# Patient Record
Sex: MALE | Race: WHITE | NOT HISPANIC OR LATINO | Employment: STUDENT | URBAN - METROPOLITAN AREA
[De-identification: names, ages, dates, MRNs, and addresses within clinical notes are randomized per-mention and may not be internally consistent; named-entity substitution may affect disease eponyms.]

---
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
## 2021-01-22 DIAGNOSIS — Z20.828 EXPOSURE TO SARS-ASSOCIATED CORONAVIRUS: Primary | ICD-10-CM

## 2021-01-25 DIAGNOSIS — Z20.828 EXPOSURE TO SARS-ASSOCIATED CORONAVIRUS: ICD-10-CM

## 2021-01-25 LAB — SARS-COV-2 RNA RESP QL NAA+PROBE: NEGATIVE

## 2021-01-25 PROCEDURE — U0003 INFECTIOUS AGENT DETECTION BY NUCLEIC ACID (DNA OR RNA); SEVERE ACUTE RESPIRATORY SYNDROME CORONAVIRUS 2 (SARS-COV-2) (CORONAVIRUS DISEASE [COVID-19]), AMPLIFIED PROBE TECHNIQUE, MAKING USE OF HIGH THROUGHPUT TECHNOLOGIES AS DESCRIBED BY CMS-2020-01-R: HCPCS | Performed by: PEDIATRICS

## 2021-01-25 PROCEDURE — U0005 INFEC AGEN DETEC AMPLI PROBE: HCPCS | Performed by: PEDIATRICS

## 2021-08-20 PROCEDURE — U0003 INFECTIOUS AGENT DETECTION BY NUCLEIC ACID (DNA OR RNA); SEVERE ACUTE RESPIRATORY SYNDROME CORONAVIRUS 2 (SARS-COV-2) (CORONAVIRUS DISEASE [COVID-19]), AMPLIFIED PROBE TECHNIQUE, MAKING USE OF HIGH THROUGHPUT TECHNOLOGIES AS DESCRIBED BY CMS-2020-01-R: HCPCS | Performed by: PEDIATRICS

## 2021-08-20 PROCEDURE — U0005 INFEC AGEN DETEC AMPLI PROBE: HCPCS | Performed by: PEDIATRICS

## 2022-02-01 NOTE — PROGRESS NOTES
1  postpartum, discharged patient, delivered via primary  section on Friday, 17, discharged on Monday, 17 arrives to unit via wheelchair for [address] labs from Dr. Vasquez Garcia office. Received phone call from Dr. Randy Braswell prior to arrival with orders for [address] labs and urine specimen. Patient oriented to Triage Bed 1 and call bell. Addended by: JOSE ROBLES on: 2/1/2022 12:21 PM     Modules accepted: Orders     Assessment  Diagnoses and all orders for this visit:    Acute pain of right shoulder  -     Ambulatory referral to Physical Therapy; Future        Discussion and Plan:    I reviewed the good news with the parents and the patient that he does not have an acute structural injury that requires surgical intervention and he can return to lacrosse activities with the help of the athletic trainers and physical therapy to rehabilitate his right shoulder  If he continues to be symptomatic with the right shoulder despite rehab then consideration to diagnostic arthroscopy would be provided but this is highly unlikely to be required at this time given the negative results of the MRI arthrogram     Subjective:   Patient ID: Hipolito Mcallister is a 25 y o  male      Patient returns for follow-up of his right shoulder MRI arthrogram   We were concerned for labral pathology and instability of the right shoulder  Symptoms are unchanged from his visit last week and has been not playing lacrosse as we wait the results of the study  Parents are present for the visit today          The following portions of the patient's history were reviewed and updated as appropriate: allergies, current medications, past family history, past medical history, past social history, past surgical history and problem list     Review of Systems   Constitutional: Negative for chills and fever  HENT: Negative for hearing loss  Eyes: Negative for visual disturbance  Respiratory: Negative for shortness of breath  Cardiovascular: Negative for chest pain  Gastrointestinal: Negative for abdominal pain  Musculoskeletal:        As reviewed in the HPI   Skin: Negative for rash  Neurological:        As reviewed in the HPI   Psychiatric/Behavioral: Negative for agitation  Objective:  Left Shoulder Exam     Tenderness   Left shoulder tenderness location: Diffusely about the shoulder      Range of Motion   The patient has normal left shoulder ROM     Muscle Strength   The patient has normal left shoulder strength  Tests   Cross arm: positive    Other   Erythema: absent  Sensation: normal  Pulse: present     Comments:  + Sawyer's Test    Painful arc of motion in all planes            Physical Exam   Constitutional: He is oriented to person, place, and time  He appears well-developed and well-nourished  No distress  Eyes: Pupils are equal, round, and reactive to light  Conjunctivae are normal    Neck: Normal range of motion  Neck supple  Cardiovascular: Normal rate, regular rhythm and intact distal pulses  Pulmonary/Chest: Effort normal and breath sounds normal    Abdominal: Soft  Bowel sounds are normal    Neurological: He is alert and oriented to person, place, and time  He has normal reflexes  Skin: Skin is warm and dry  No rash noted  No erythema  Psychiatric: He has a normal mood and affect  His behavior is normal            I have personally reviewed pertinent films in PACS and my interpretation is as follows      MRI arthrogram right shoulder shows no evidence of labral pathology or rotator cuff tear, the do comment on some rotator cuff tendinitis which in my opinion is normal on that study

## 2022-03-05 ENCOUNTER — OFFICE VISIT (OUTPATIENT)
Dept: URGENT CARE | Facility: CLINIC | Age: 22
End: 2022-03-05
Payer: COMMERCIAL

## 2022-03-05 VITALS
RESPIRATION RATE: 16 BRPM | HEART RATE: 60 BPM | WEIGHT: 150 LBS | HEIGHT: 67 IN | BODY MASS INDEX: 23.54 KG/M2 | TEMPERATURE: 98.4 F

## 2022-03-05 DIAGNOSIS — H10.9 BACTERIAL CONJUNCTIVITIS OF BOTH EYES: Primary | ICD-10-CM

## 2022-03-05 DIAGNOSIS — B96.89 BACTERIAL CONJUNCTIVITIS OF BOTH EYES: Primary | ICD-10-CM

## 2022-03-05 DIAGNOSIS — B96.89 BACTERIAL PHARYNGITIS: ICD-10-CM

## 2022-03-05 DIAGNOSIS — J02.8 BACTERIAL PHARYNGITIS: ICD-10-CM

## 2022-03-05 PROCEDURE — 99213 OFFICE O/P EST LOW 20 MIN: CPT | Performed by: PHYSICIAN ASSISTANT

## 2022-03-05 RX ORDER — POLYMYXIN B SULFATE AND TRIMETHOPRIM 1; 10000 MG/ML; [USP'U]/ML
1 SOLUTION OPHTHALMIC EVERY 4 HOURS
Qty: 10 ML | Refills: 0 | Status: SHIPPED | OUTPATIENT
Start: 2022-03-05 | End: 2022-03-12

## 2022-03-05 RX ORDER — LIDOCAINE HYDROCHLORIDE 20 MG/ML
10 SOLUTION OROPHARYNGEAL 4 TIMES DAILY PRN
Qty: 100 ML | Refills: 0 | Status: SHIPPED | OUTPATIENT
Start: 2022-03-05

## 2022-03-05 RX ORDER — AZITHROMYCIN 250 MG/1
TABLET, FILM COATED ORAL
Qty: 6 TABLET | Refills: 0 | Status: SHIPPED | OUTPATIENT
Start: 2022-03-05 | End: 2022-03-09

## 2022-03-05 NOTE — PATIENT INSTRUCTIONS
Begin antibiotic as directed  Take with probiotic or yogurt to prevent stomach upset  Begin use of antibiotic eyedrops  Wash all sheets, bedding, anything that has come in contact with URIs  Avoid itching or rubbing at the eyes  Continue with supportive care: warm salt water gargles, lozenges, warm humidified air, saline nasal spray, plenty of fluids, plenty of rest, and over the counter pain medication as needed  Follow up with PCP in 3-5 days if symptoms do not resolve  Proceed to the ER with worsening throat pain, fever, chills,difficulties breathing, difficulties tolerating oral intake  Conjunctivitis   WHAT YOU SHOULD KNOW:   Conjunctivitis, or pink eye, is inflammation of your conjunctiva  The conjunctiva is a thin tissue that covers the front of your eye and the back of your eyelids  The conjunctiva helps protect your eye and keep it moist         INSTRUCTIONS:   Medicines:   · Allergy medicine: This medicine helps decrease itchy, red, swollen eyes caused by allergies  It may be given as a pill, eye drops, or nasal spray  · Antibiotics:  You will need antibiotics if your conjunctivitis is caused by bacteria  This medicine may be given as eye drops or eye ointment  · Steroid medicine: This medicine helps decrease inflammation  It may be given as a pill, eye drops, or nasal spray  · Take your medicine as directed  Call your healthcare provider if you think your medicine is not helping or if you have side effects  Tell him if you are allergic to any medicine  Keep a list of the medicines, vitamins, and herbs you take  Include the amounts, and when and why you take them  Bring the list or the pill bottles to follow-up visits  Carry your medicine list with you in case of an emergency  Follow up with your primary healthcare provider as directed: You may need to return for more tests on your eyes  These will help your primary healthcare provider check for eye damage   Write down your questions so you remember to ask them during your visits  Avoid the spread of conjunctivitis:   · Wash your hands often:  Wash your hands before you touch your eyes  Also wash your hands before you prepare or eat food and after you use the bathroom or change a diaper  · Avoid allergens:  Try to avoid the things that cause your allergies, such as pets, dust, or grass  · Avoid contact:  Do not share towels or washcloths  Try to stay away from others as much as possible  Ask when you can return to work or school  · Throw away eye makeup:  Throw away mascara and other eye makeup  Manage your symptoms:  · Apply a cool compress:  Wet a washcloth with cold water and place it on your eye  This will help decrease swelling  · Use eye drops:  Eye drops, or artificial tears, can be bought without a doctor's order  They help keep your eye moist     · Do not wear contact lenses: They can irritate your eye  Throw away the pair you are using and ask when you can wear them again  Use a new pair of lenses when your primary healthcare provider says it is okay  · Flush your eye:  You may need to flush your eye with saline to help decrease your symptoms  Ask for more information on how to flush your eye  Contact your primary healthcare provider if:   · Your eyesight becomes blurry  · You have tiny bumps or spots of blood on your eye  · You have questions or concerns about your condition or care  Return to the emergency department if:   · The swelling in your eye gets worse, even after treatment  · Your vision suddenly becomes worse or you cannot see at all  · Your eye begins to bleed  © 2014 [address] is for End User's use only and may not be sold, redistributed or otherwise used for commercial purposes  All illustrations and images included in CareNotes® are the copyrighted property of [employer]  or Fransico Peralta    The above information is an educational aid only  It is not intended as medical advice for individual conditions or treatments  Talk to your doctor, nurse or pharmacist before following any medical regimen to see if it is safe and effective for you  Pharyngitis   WHAT YOU NEED TO KNOW:   Pharyngitis, or sore throat, is inflammation of the tissues and structures in your pharynx (throat)  Pharyngitis is most often caused by bacteria  It may also be caused by a cold or flu virus  Other causes include smoking, allergies, or acid reflux  DISCHARGE INSTRUCTIONS:   Call 911 for any of the following:   · You have trouble breathing or swallowing because your throat is swollen or sore  Return to the emergency department if:   · You are drooling because it hurts too much to swallow  · Your fever is higher than 102? F (39?C) or lasts longer than 3 days  · You are confused  · You taste blood in your throat  Contact your healthcare provider if:   · Your throat pain gets worse  · You have a painful lump in your throat that does not go away after 5 days  · Your symptoms do not improve after 5 days  · You have questions or concerns about your condition or care  Medicines:  Viral pharyngitis will go away on its own without treatment  Your sore throat should start to feel better in 3 to 5 days for both viral and bacterial infections  You may need any of the following:  · Antibiotics  treat a bacterial infection  · NSAIDs , such as ibuprofen, help decrease swelling, pain, and fever  NSAIDs can cause stomach bleeding or kidney problems in certain people  If you take blood thinner medicine, always ask your healthcare provider if NSAIDs are safe for you  Always read the medicine label and follow directions  · Acetaminophen  decreases pain and fever  It is available without a doctor's order  Ask how much to take and how often to take it  Follow directions  Acetaminophen can cause liver damage if not taken correctly      · Take your medicine as directed  Contact your healthcare provider if you think your medicine is not helping or if you have side effects  Tell him or her if you are allergic to any medicine  Keep a list of the medicines, vitamins, and herbs you take  Include the amounts, and when and why you take them  Bring the list or the pill bottles to follow-up visits  Carry your medicine list with you in case of an emergency  Manage your symptoms:   · Gargle salt water  Mix ¼ teaspoon salt in an 8 ounce glass of warm water and gargle  This may help decrease swelling in your throat  · Drink liquids as directed  You may need to drink more liquids than usual  Liquids may help soothe your throat and prevent dehydration  Ask how much liquid to drink each day and which liquids are best for you  · Use a cool-steam humidifier  to help moisten the air in your room and calm your cough  · Soothe your throat  with cough drops, ice, soft foods, or popsicles  Prevent the spread of pharyngitis:  Cover your mouth and nose when you cough or sneeze  Do not share food or drinks  Wash your hands often  Use soap and water  If soap and water are unavailable, use an alcohol based hand   Follow up with your doctor as directed:  Write down your questions so you remember to ask them during your visits  © Copyright [employer] 2022 Information is for End User's use only and may not be sold, redistributed or otherwise used for commercial purposes  All illustrations and images included in CareNotes® are the copyrighted property of [employer]  or Carli Naik   The above information is an  only  It is not intended as medical advice for individual conditions or treatments  Talk to your doctor, nurse or pharmacist before following any medical regimen to see if it is safe and effective for you

## 2022-03-05 NOTE — PROGRESS NOTES
[address] [facility]        NAME: Elisabet Pete is a 24 y o  male  : 2000    MRN: [id number]  DATE: 2022  TIME: 12:10 PM    Assessment and Plan   Bacterial conjunctivitis of both eyes [H10 9, B96 89]  1  Bacterial conjunctivitis of both eyes  polymyxin b-trimethoprim (POLYTRIM) ophthalmic solution   2  Bacterial pharyngitis  Lidocaine Viscous HCl (XYLOCAINE) 2 % mucosal solution    azithromycin (ZITHROMAX) 250 mg tablet         Patient Instructions     Begin antibiotic as directed  Take with probiotic or yogurt to prevent stomach upset  Begin use of antibiotic eyedrops  Wash all sheets, bedding, anything that has come in contact with URIs  Avoid itching or rubbing at the eyes  Continue with supportive care: warm salt water gargles, lozenges, warm humidified air, saline nasal spray, plenty of fluids, plenty of rest, and over the counter pain medication as needed  Follow up with PCP in 3-5 days if symptoms do not resolve  Proceed to the ER with worsening throat pain, fever, chills,difficulties breathing, difficulties tolerating oral intake  Follow up with PCP in 3-5 days  Proceed to  ER if symptoms worsen  Chief Complaint     Chief Complaint   Patient presents with    Conjunctivitis     both eyes red for 3 days, discharge, sore throat on and off 3 days         History of Present Illness       24year old male presents to the office for c/o of three day history of ralph eye redness, discharge as well as soreness of the throat on and off over the last three days  Patient notes that symptoms eye redness and discharge began in the left eye in the travel to the right  His roommate was sick with pinkeye as well  Patient notes that his sore throat with increased with swallowing  Does admit to history of strep throat before in the past   Denies history of mono  Has been using over-the-counter Tylenol to pain      Conjunctivitis   The current episode started 3 to 5 days ago   The problem has been rapidly worsening  Associated symptoms include eye itching, diarrhea (this morning ), congestion, rhinorrhea, sore throat, eye discharge and eye redness  Pertinent negatives include no fever, no photophobia, no headaches, no cough, no rash and no eye pain  Review of Systems   Review of Systems   Constitutional: Negative for chills and fever  HENT: Positive for congestion, rhinorrhea, sore throat and trouble swallowing  Negative for sinus pressure and sinus pain  Eyes: Positive for discharge, redness and itching  Negative for photophobia and pain  Respiratory: Negative for cough  Cardiovascular: Negative  Gastrointestinal: Positive for diarrhea (this morning )  Genitourinary: Negative  Musculoskeletal: Negative for myalgias  Skin: Negative for rash  Neurological: Negative for dizziness and headaches  Current Medications       Current Outpatient Medications:     azithromycin (ZITHROMAX) 250 mg tablet, Take 2 tablets today then 1 tablet daily x 4 days, Disp: 6 tablet, Rfl: 0    Lidocaine Viscous HCl (XYLOCAINE) 2 % mucosal solution, Swish and spit 10 mL 4 (four) times a day as needed for mouth pain or discomfort or mucositis, Disp: 100 mL, Rfl: 0    polymyxin b-trimethoprim (POLYTRIM) ophthalmic solution, Administer 1 drop to both eyes every 4 (four) hours for 7 days, Disp: 10 mL, Rfl: 0    Current Allergies     Allergies as of 03/05/2022 - Reviewed 03/05/2022   Allergen Reaction Noted    Shellfish-derived products - food allergy Anaphylaxis 05/04/2021    Fish oil - food allergy  03/14/2016    Ibuprofen  04/16/2018            The following portions of the patient's history were reviewed and updated as appropriate: allergies, current medications, past family history, past medical history, past social history, past surgical history and problem list      No past medical history on file      Past Surgical History:   Procedure Laterality Date    FL INJECTION RIGHT SHOULDER (ARTHROGRAM)  3/25/2019       Family History   Problem Relation Age of Onset    Osteoporosis Maternal Grandfather          Medications have been verified  Objective   Pulse 60   Temp 98 4 °F (36 9 °C) (Temporal)   Resp 16   Ht 5' 7" (1 702 m)   Wt 68 kg (150 lb)   BMI 23 49 kg/m²   No LMP for male patient  Physical Exam     Physical Exam  Vitals and nursing note reviewed  Constitutional:       General: He is not in acute distress  Appearance: He is well-developed  He is not ill-appearing or diaphoretic  HENT:      Head: Normocephalic and atraumatic  Right Ear: Hearing, ear canal and external ear normal  A middle ear effusion is present  Left Ear: Hearing, ear canal and external ear normal  A middle ear effusion is present  Nose: Congestion present  No mucosal edema or rhinorrhea  Mouth/Throat:      Lips: Pink  Mouth: Mucous membranes are moist       Pharynx: Uvula midline  Oropharyngeal exudate and posterior oropharyngeal erythema present  No uvula swelling  Eyes:      General: Lids are normal          Right eye: Discharge present  No foreign body  Left eye: Discharge present  No foreign body  Extraocular Movements:      Right eye: Normal extraocular motion  Left eye: Normal extraocular motion  Conjunctiva/sclera:      Right eye: Right conjunctiva is injected  Left eye: Left conjunctiva is injected  Pupils: Pupils are equal, round, and reactive to light  Comments: Significant conjunctiva erythema without ciliary flush  EOMs intact  Moderate amount of thick discharge in bilateral medial canthus   Cardiovascular:      Rate and Rhythm: Normal rate and regular rhythm  Heart sounds: Normal heart sounds, S1 normal and S2 normal  No murmur heard  Pulmonary:      Effort: Pulmonary effort is normal  No respiratory distress  Breath sounds: Normal breath sounds  No stridor   No decreased breath sounds, wheezing or rales  Abdominal:      General: Bowel sounds are normal       Palpations: Abdomen is soft  Tenderness: There is no abdominal tenderness  Musculoskeletal:      Cervical back: Neck supple  Lymphadenopathy:      Cervical: Cervical adenopathy present  Right cervical: Superficial cervical adenopathy present  Left cervical: Superficial cervical adenopathy present  Skin:     General: Skin is warm and dry  Findings: No rash  Neurological:      Mental Status: He is alert  Psychiatric:         Behavior: Behavior is cooperative

## 2023-06-09 ENCOUNTER — OFFICE VISIT (OUTPATIENT)
Dept: FAMILY MEDICINE CLINIC | Facility: CLINIC | Age: 23
End: 2023-06-09
Payer: COMMERCIAL

## 2023-06-09 VITALS
DIASTOLIC BLOOD PRESSURE: 68 MMHG | WEIGHT: 161 LBS | SYSTOLIC BLOOD PRESSURE: 122 MMHG | OXYGEN SATURATION: 98 % | RESPIRATION RATE: 16 BRPM | BODY MASS INDEX: 25.88 KG/M2 | HEIGHT: 66 IN | HEART RATE: 70 BPM

## 2023-06-09 DIAGNOSIS — R30.0 DYSURIA: Primary | ICD-10-CM

## 2023-06-09 DIAGNOSIS — Z86.19 HISTORY OF CHLAMYDIA INFECTION: ICD-10-CM

## 2023-06-09 LAB
SL AMB  POCT GLUCOSE, UA: NORMAL
SL AMB LEUKOCYTE ESTERASE,UA: NORMAL
SL AMB POCT BILIRUBIN,UA: NORMAL
SL AMB POCT BLOOD,UA: NORMAL
SL AMB POCT CLARITY,UA: NORMAL
SL AMB POCT COLOR,UA: YELLOW
SL AMB POCT KETONES,UA: NORMAL
SL AMB POCT NITRITE,UA: NORMAL
SL AMB POCT PH,UA: 7
SL AMB POCT SPECIFIC GRAVITY,UA: 1.01
SL AMB POCT URINE PROTEIN: NORMAL
SL AMB POCT UROBILINOGEN: NORMAL

## 2023-06-09 PROCEDURE — 99203 OFFICE O/P NEW LOW 30 MIN: CPT | Performed by: FAMILY MEDICINE

## 2023-06-09 PROCEDURE — 81003 URINALYSIS AUTO W/O SCOPE: CPT | Performed by: FAMILY MEDICINE

## 2023-06-09 RX ORDER — AZITHROMYCIN 250 MG/1
TABLET, FILM COATED ORAL
Qty: 4 TABLET | Refills: 0 | Status: SHIPPED | OUTPATIENT
Start: 2023-06-09 | End: 2023-06-10

## 2023-06-09 NOTE — PROGRESS NOTES
Subjective:      Patient ID: Raphael Espinoza is a 25 y o  male  41-year-old male presents to the office complaining of intermittent episodes of dysuria, cloudy urine and left testicular pain  All this essentially started when he was diagnosed with chlamydia infection  He was told by his then girlfriend that he should be tested as she had tested positive  He tested positive and was treated at a clinic by his [facility] for both gonorrhea and chlamydia  He never had repeat testing for cure  He was no longer with that prior girlfriend but was with a new girlfriend who tested positive at the same time for chlamydia  They were both treated at the same time  Reportedly his current girlfriend who was the second girlfriend was at gynecologist office and tested negative for chlamydia but positive for yeast   At times he will wake up and feels as though he has bladder pain and discomfort when urinating  There is no urethral discharge      No past medical history on file  Family History   Problem Relation Age of Onset   • Osteoporosis Maternal Grandfather        Past Surgical History:   Procedure Laterality Date   • FL INJECTION RIGHT SHOULDER (ARTHROGRAM)  3/25/2019        reports that he has never smoked  He has never used smokeless tobacco  He reports that he does not drink alcohol  Current Outpatient Medications:   •  azithromycin (ZITHROMAX) 250 mg tablet, 500 mg twice daily x1 day, Disp: 4 tablet, Rfl: 0  •  Lidocaine Viscous HCl (XYLOCAINE) 2 % mucosal solution, Swish and spit 10 mL 4 (four) times a day as needed for mouth pain or discomfort or mucositis (Patient not taking: Reported on 6/9/2023), Disp: 100 mL, Rfl: 0    The following portions of the patient's history were reviewed and updated as appropriate: allergies, current medications, past family history, past medical history, past social history, past surgical history and problem list     Review of Systems   Constitutional: Negative  "  Genitourinary: Positive for dysuria and testicular pain  Negative for decreased urine volume, frequency, hematuria, penile discharge, penile pain, penile swelling, scrotal swelling and urgency  Objective:    /68   Pulse 70   Resp 16   Ht 5' 6\" (1 676 m)   Wt 73 kg (161 lb)   SpO2 98%   BMI 25 99 kg/m²      Physical Exam  Vitals and nursing note reviewed  Constitutional:       General: He is not in acute distress  Appearance: Normal appearance  He is normal weight  He is not ill-appearing  Abdominal:      Hernia: There is no hernia in the left inguinal area or right inguinal area  Genitourinary:     Penis: Normal and circumcised  No erythema or discharge  Testes:         Right: Mass, tenderness, swelling, testicular hydrocele or varicocele not present  Right testis is descended  Cremasteric reflex is present  Left: Tenderness and varicocele ( small) present  Mass or swelling not present  Left testis is descended  Cremasteric reflex is present  Epididymis:      Right: Normal       Left: Not inflamed or enlarged  Tenderness present  No mass  Lymphadenopathy:      Lower Body: No right inguinal adenopathy  No left inguinal adenopathy  Neurological:      Mental Status: He is alert  Recent Results (from the past 1008 hour(s))   POCT urine dip    Collection Time: 06/09/23  3:57 PM   Result Value Ref Range    LEUKOCYTE ESTERASE,UA neg     NITRITE,UA neg     SL AMB POCT UROBILINOGEN neg     POCT URINE PROTEIN neg      PH,UA 7     BLOOD,UA neg     SPECIFIC GRAVITY,UA 1 010     KETONES,UA neg     BILIRUBIN,UA neg     GLUCOSE, UA norm      COLOR,UA yellow     CLARITY,UA transparent        Assessment/Plan:    History of chlamydia infection   Urine will be sent for GC/chlamydia testing  Doubtful for gonorrhea infection as he has no urethral discharge  Chlamydia is possibility though unlikely as his current sexual partner tested negative    He was treated back in " December for chlamydial infection after testing positive    -There is documentation of resistance of chlamydia to standard treatments  We will see if he does positive  If he does test positive then he should be treated with 1 g of azithromycin    Dysuria  Urinalysis is completely unremarkable in the office  Has a normal urogenital examination          Problem List Items Addressed This Visit        Other    Dysuria - Primary     Urinalysis is completely unremarkable in the office  Has a normal urogenital examination         Relevant Medications    azithromycin (ZITHROMAX) 250 mg tablet    Other Relevant Orders    Chlamydia/GC amplified DNA by PCR    POCT urine dip (Completed)    History of chlamydia infection      Urine will be sent for GC/chlamydia testing  Doubtful for gonorrhea infection as he has no urethral discharge  Chlamydia is possibility though unlikely as his current sexual partner tested negative  He was treated back in December for chlamydial infection after testing positive    -There is documentation of resistance of chlamydia to standard treatments  We will see if he does positive    If he does test positive then he should be treated with 1 g of azithromycin

## 2023-06-09 NOTE — ASSESSMENT & PLAN NOTE
Urine will be sent for GC/chlamydia testing  Doubtful for gonorrhea infection as he has no urethral discharge  Chlamydia is possibility though unlikely as his current sexual partner tested negative  He was treated back in December for chlamydial infection after testing positive    -There is documentation of resistance of chlamydia to standard treatments  We will see if he does positive    If he does test positive then he should be treated with 1 g of azithromycin

## 2023-06-10 LAB
C TRACH RRNA SPEC QL NAA+PROBE: NOT DETECTED
N GONORRHOEA RRNA SPEC QL NAA+PROBE: NOT DETECTED
SPECIMEN SOURCE: NORMAL

## 2023-06-12 ENCOUNTER — TELEPHONE (OUTPATIENT)
Dept: FAMILY MEDICINE CLINIC | Facility: CLINIC | Age: 23
End: 2023-06-12

## 2023-06-12 NOTE — TELEPHONE ENCOUNTER
----- Message from Harry Roger DO sent at 6/12/2023  8:32 AM EDT -----  Please call patient and notify that results are normal   Gonorrhea and Chlamydia testing are both negative  Those are not present  No chance in hades that he has an infection with those  Urine culture really did not show anything    Relax, make sure you are drinking water and have an enjoyable life

## 2023-06-12 NOTE — RESULT ENCOUNTER NOTE
Please call patient and notify that results are normal   Gonorrhea and Chlamydia testing are both negative  Those are not present  No chance in hades that he has an infection with those  Urine culture really did not show anything    Relax, make sure you are drinking water and have an enjoyable life

## 2024-11-05 ENCOUNTER — TELEPHONE (OUTPATIENT)
Age: 24
End: 2024-11-05

## 2024-11-05 NOTE — TELEPHONE ENCOUNTER
Patient is calling because he thinks he might have a UTI . Patient is scheduled for 11/08 @ 9:40 a.m. Patient would like sooner appointment if possible  and an order put in for lab work if needed prior to appointment. Thank you in advance.

## 2024-11-06 ENCOUNTER — APPOINTMENT (OUTPATIENT)
Dept: LAB | Facility: CLINIC | Age: 24
End: 2024-11-06
Payer: COMMERCIAL

## 2024-11-06 ENCOUNTER — OFFICE VISIT (OUTPATIENT)
Dept: FAMILY MEDICINE CLINIC | Facility: CLINIC | Age: 24
End: 2024-11-06

## 2024-11-06 VITALS
OXYGEN SATURATION: 97 % | HEIGHT: 67 IN | RESPIRATION RATE: 16 BRPM | DIASTOLIC BLOOD PRESSURE: 82 MMHG | HEART RATE: 80 BPM | SYSTOLIC BLOOD PRESSURE: 120 MMHG | BODY MASS INDEX: 24.96 KG/M2 | WEIGHT: 159 LBS

## 2024-11-06 DIAGNOSIS — R30.0 BURNING WITH URINATION: ICD-10-CM

## 2024-11-06 DIAGNOSIS — Z86.19 HISTORY OF CHLAMYDIA INFECTION: ICD-10-CM

## 2024-11-06 DIAGNOSIS — R30.0 BURNING WITH URINATION: Primary | ICD-10-CM

## 2024-11-06 LAB
HAV IGM SER QL: NORMAL
HBV CORE IGM SER QL: NORMAL
HBV SURFACE AG SER QL: NORMAL
HCV AB SER QL: NORMAL
SL AMB  POCT GLUCOSE, UA: NORMAL
SL AMB LEUKOCYTE ESTERASE,UA: NORMAL
SL AMB POCT BILIRUBIN,UA: NORMAL
SL AMB POCT BLOOD,UA: NORMAL
SL AMB POCT CLARITY,UA: CLEAR
SL AMB POCT COLOR,UA: YELLOW
SL AMB POCT KETONES,UA: NORMAL
SL AMB POCT NITRITE,UA: NORMAL
SL AMB POCT PH,UA: 7
SL AMB POCT SPECIFIC GRAVITY,UA: 1
SL AMB POCT URINE PROTEIN: NORMAL
SL AMB POCT UROBILINOGEN: NORMAL
TREPONEMA PALLIDUM IGG+IGM AB [PRESENCE] IN SERUM OR PLASMA BY IMMUNOASSAY: NORMAL

## 2024-11-06 PROCEDURE — 86780 TREPONEMA PALLIDUM: CPT

## 2024-11-06 PROCEDURE — 36415 COLL VENOUS BLD VENIPUNCTURE: CPT

## 2024-11-06 PROCEDURE — 80074 ACUTE HEPATITIS PANEL: CPT

## 2024-11-06 PROCEDURE — 87389 HIV-1 AG W/HIV-1&-2 AB AG IA: CPT

## 2024-11-06 NOTE — PROGRESS NOTES
"Assessment/Plan:   Diagnoses and all orders for this visit:    Burning with urination  -     POCT urine dip  -     Chlamydia/GC amplified DNA by PCR; Future  -     HIV 1/2 AG/AB w Reflex [facility] for 2 yr old and above; Future  -     RPR-Syphilis Screening (Total Syphilis IGG/IGM); Future  -     Hepatitis panel, acute; Future  - urine dip nml in office - no UTI   - will check STD panel as pt with h/o Chlamydia infection 2yrs prior though pt w/o discharge and has been with same partner for the past 2yrs --- if he does test POS will treat with Azithromycin 1g    - has been masturbating more and there is some erythema present   - will f/u after labs - pt aware and agreeable    History of chlamydia infection          Subjective:    Patient ID: Octavio Duran is a 23 y.o. male.  HPI  24yo M presents to the office for eval of dysuria   - h/o Chlamydia 2yrs ago   - (+) dysuria and testicular pain   - urine is not as cloudy as it was it last year   - urine dip in the office = nml   - no urethral discharge   - has been masturbating more frequently in the past 2wks       The following portions of the patient's history were reviewed and updated as appropriate: allergies, current medications, past family history, past medical history, past social history, past surgical history and problem list.    Review of Systems  as per HPI    Objective:  /82   Pulse 80   Resp 16   Ht 5' 7\" (1.702 m)   Wt 72.1 kg (159 lb)   SpO2 97%   BMI 24.90 kg/m²    Physical Exam  Vitals reviewed.   Constitutional:       General: He is not in acute distress.     Appearance: Normal appearance. He is not ill-appearing, toxic-appearing or diaphoretic.   HENT:      Head: Normocephalic and atraumatic.      Right Ear: External ear normal.      Left Ear: External ear normal.      Nose: Nose normal.   Eyes:      General: No scleral icterus.        Right eye: No discharge.         Left eye: No discharge.      Extraocular Movements: Extraocular " movements intact.      Conjunctiva/sclera: Conjunctivae normal.   Genitourinary:     Penis: Circumcised. Erythema present. No tenderness, discharge, swelling or lesions.       Testes: Normal.   Musculoskeletal:         General: Normal range of motion.      Right lower leg: No edema.      Left lower leg: No edema.   Skin:     General: Skin is warm.   Neurological:      General: No focal deficit present.      Mental Status: He is alert and oriented to person, place, and time.   Psychiatric:         Mood and Affect: Mood is anxious.

## 2024-11-07 LAB
HIV 1+2 AB+HIV1 P24 AG SERPL QL IA: NORMAL
HIV 2 AB SERPL QL IA: NORMAL
HIV1 AB SERPL QL IA: NORMAL
HIV1 P24 AG SERPL QL IA: NORMAL

## 2024-11-08 ENCOUNTER — TELEPHONE (OUTPATIENT)
Age: 24
End: 2024-11-08

## 2024-11-08 NOTE — TELEPHONE ENCOUNTER
Diagnosis/Complaint:Scrotal discomfort    Insurance:[employer]    History cancer:no    Previous urologist:no    [facility] testing[facility]where[facility]    If yes what kind:[facility]    Records requested:[facility]    Preferred location:[facility]   appointment 11/22

## 2024-11-22 ENCOUNTER — OFFICE VISIT (OUTPATIENT)
Age: 24
End: 2024-11-22
Payer: COMMERCIAL

## 2024-11-22 VITALS
SYSTOLIC BLOOD PRESSURE: 110 MMHG | DIASTOLIC BLOOD PRESSURE: 80 MMHG | OXYGEN SATURATION: 99 % | HEART RATE: 83 BPM | HEIGHT: 67 IN | BODY MASS INDEX: 24.9 KG/M2

## 2024-11-22 DIAGNOSIS — N45.1 EPIDIDYMITIS: Primary | ICD-10-CM

## 2024-11-22 PROCEDURE — 99203 OFFICE O/P NEW LOW 30 MIN: CPT | Performed by: PHYSICIAN ASSISTANT

## 2024-11-22 RX ORDER — DOXYCYCLINE 100 MG/1
100 CAPSULE ORAL 2 TIMES DAILY
Qty: 14 CAPSULE | Refills: 0 | Status: SHIPPED | OUTPATIENT
Start: 2024-11-22 | End: 2024-11-29

## 2024-11-22 NOTE — PROGRESS NOTES
UROLOGY CONSULTATION NOTE     Patient Identifiers: Octavio Duran (MRN: [id number])  Service Requesting Consultation: Jared Chanel DO    Service Providing Consultation:  Urology, Marcel Tomlin PA-C  Consults  Date of Service: 11/22/2024    Reason for Consultation: Left orchialgia    History of Present Illness:     Octavio Duran is a 24 y.o. male who had an STI with chlamydia last year.  He was treated appropriately.  Several weeks later he had a flareup of orchialgia which was just treated with local care.  He has had no difficulty in the interim until recently when he started to develop left-sided orchialgia once again he feels there is swelling in the back of the testicle and the epididymis.  He has been in a monogamous relationship with his girlfriend.  Denies any high risk behavior.  Has no urinary symptoms.    Past Medical, Past Surgical History:   No past medical history on file.:    Past Surgical History:   Procedure Laterality Date    FL INJECTION RIGHT SHOULDER (ARTHROGRAM)  3/25/2019   :    Medications, Allergies:     Current Outpatient Medications:     Lidocaine Viscous HCl (XYLOCAINE) 2 % mucosal solution, Swish and spit 10 mL 4 (four) times a day as needed for mouth pain or discomfort or mucositis (Patient not taking: Reported on 6/9/2023), Disp: 100 mL, Rfl: 0    Allergies:  Allergies   Allergen Reactions    Shellfish-Derived Products - Food Allergy Anaphylaxis    Fish Oil - Food Allergy     Ibuprofen    :    Social and Family History:   Social History:   Social History     Tobacco Use    Smoking status: Never    Smokeless tobacco: Never   Vaping Use    Vaping status: Never Used   Substance Use Topics    Alcohol use: No   .    Social History     Tobacco Use   Smoking Status Never   Smokeless Tobacco Never       Family History:  Family History   Problem Relation Age of Onset    Osteoporosis Maternal Grandfather    :     Review of Systems:     General: Fever, chills, or night sweats:  "negative  Cardiac: Negative for chest pain.    Pulmonary: Negative for shortness of breath.  Gastrointestinal: Abdominal pain negative.  Nausea, vomiting, or diarrhea negative,  Genitourinary: See HPI above.  Patient does not have hematuria.  All other systems queried were negative.    Physical Exam:   General: Patient is pleasant and in NAD. Awake and alert  There were no vitals taken for this visit.  HEENT:  Conjunctiva are clear  Constitutional:  pleasant and cooperative     no apparent distress  Cardiac: Peripheral edema: negative  Pulmonary: Non-labored breathing  Abdomen: Soft, non-tender, non-distended.  No surgical scars.  No masses, tenderness, hernias noted.    Genitourinary: Negative CVA tenderness, negative suprapubic tenderness.  Extremities:  Moves all extremities  Neurological:CNII-XII intact. No numbness or tingling. Essentially non focal neurologic exam  Psychiatric:mood affect and behavior normal    (Male): Penis circumcised, phallus normal, meatus patent.  Testicles descended into scrotum bilaterally without masses nor tenderness.  No inguinal hernias bilaterally.    Labs:   No results found for: \"HGB\", \"HCT\", \"WBC\", \"PLT\"]    No results found for: \"NA\", \"K\", \"CL\", \"CO2\", \"BUN\", \"CREATININE\", \"CALCIUM\", \"GLUCOSE\"]    Imaging:   I personally reviewed the images and report of the following studies, and reviewed them with the patient:  none    ASSESSMENT:   #1.  Epididymitis  #2.  Post STI syndrome    PLAN:   -I reviewed the findings and the options of management with the patient  -Will give him a course of doxycycline and get a ultrasound for documentation purposes  -I explained to him I do not feel anything concerning on his exam  -He will follow-up with me in January      Thank you for allowing me to participate in this patients’ care.  Please do not hesitate to call with any additional questions.  Marcel Tomlin PA-C    "

## 2024-11-29 ENCOUNTER — HOSPITAL ENCOUNTER (OUTPATIENT)
Dept: ULTRASOUND IMAGING | Facility: HOSPITAL | Age: 24
End: 2024-11-29
Payer: COMMERCIAL

## 2024-11-29 ENCOUNTER — RESULTS FOLLOW-UP (OUTPATIENT)
Age: 24
End: 2024-11-29

## 2024-11-29 DIAGNOSIS — N45.1 EPIDIDYMITIS: ICD-10-CM

## 2024-11-29 PROCEDURE — 76870 US EXAM SCROTUM: CPT

## 2024-11-29 NOTE — TELEPHONE ENCOUNTER
----- Message from Marcel Tomlin PA-C sent at 11/29/2024 12:40 PM EST -----  Please let the patient know his ultrasound is stable showing only a small left epididymal cyst which is of no concern.  ----- Message -----  From: Interface, Radiology Results In  Sent: 11/29/2024   9:55 AM EST  To: Marcel Tomlin PA-C

## 2024-11-29 NOTE — TELEPHONE ENCOUNTER
Pt returned call and I informed him of the US results per Dinh.     PT is asking what his follow up should be at this point.     Pt is scheduled for 2/28/25     Pt can be reached at  [phone]

## 2024-11-29 NOTE — TELEPHONE ENCOUNTER
Left a basic voicemail due to his communication sheet preferences. When patient returns phone call, please advise of AP reviewed ultrasound results as documented. Office phone number given in message.

## 2024-12-19 ENCOUNTER — OFFICE VISIT (OUTPATIENT)
Dept: FAMILY MEDICINE CLINIC | Facility: CLINIC | Age: 24
End: 2024-12-19
Payer: COMMERCIAL

## 2024-12-19 VITALS
OXYGEN SATURATION: 98 % | SYSTOLIC BLOOD PRESSURE: 120 MMHG | BODY MASS INDEX: 24.64 KG/M2 | HEART RATE: 72 BPM | DIASTOLIC BLOOD PRESSURE: 60 MMHG | TEMPERATURE: 97.8 F | WEIGHT: 157 LBS | HEIGHT: 67 IN

## 2024-12-19 DIAGNOSIS — Z87.448 HISTORY OF URETHRITIS: ICD-10-CM

## 2024-12-19 DIAGNOSIS — Z86.19 HISTORY OF CHLAMYDIA INFECTION: Primary | ICD-10-CM

## 2024-12-19 PROBLEM — R30.0 DYSURIA: Status: RESOLVED | Noted: 2023-06-09 | Resolved: 2024-12-19

## 2024-12-19 LAB
SL AMB  POCT GLUCOSE, UA: NORMAL
SL AMB LEUKOCYTE ESTERASE,UA: NORMAL
SL AMB POCT BILIRUBIN,UA: NORMAL
SL AMB POCT BLOOD,UA: NORMAL
SL AMB POCT CLARITY,UA: CLEAR
SL AMB POCT COLOR,UA: YELLOW
SL AMB POCT KETONES,UA: NORMAL
SL AMB POCT NITRITE,UA: NORMAL
SL AMB POCT PH,UA: 7
SL AMB POCT SPECIFIC GRAVITY,UA: 1
SL AMB POCT URINE PROTEIN: NORMAL
SL AMB POCT UROBILINOGEN: NORMAL

## 2024-12-19 PROCEDURE — 81003 URINALYSIS AUTO W/O SCOPE: CPT | Performed by: FAMILY MEDICINE

## 2024-12-19 PROCEDURE — 99213 OFFICE O/P EST LOW 20 MIN: CPT | Performed by: FAMILY MEDICINE

## 2024-12-19 NOTE — PROGRESS NOTES
"  Subjective:      Patient ID: Octavio Duran is a 24 y.o. male.    Generally healthy 24-year-old male presents to the office complaining of left testicular discomfort and some discomfort with urination.  Patient only wanted to be seen by me.  He has become somewhat fixated on the fact that he had chlamydia urethritis that was treated.  He was with previous girlfriend who had tested positive.  He and his current girlfriend both tested positive for chlamydia but both were treated and both had negative results.  I tested him again which was negative and he came in yet again with another physician in this practice who did STD testing all of which was negative.  He even saw urology who checked him out ordered a scrotal ultrasound.  He was noted to have a 1 cm left sided epididymal cyst but otherwise was adequately treated and had normal urine.  In his mind he has had off-and-on discomfort of his left testicle        No past medical history on file.    Family History   Problem Relation Age of Onset   • Osteoporosis Maternal Grandfather        Past Surgical History:   Procedure Laterality Date   • [place] INJECTION RIGHT SHOULDER (ARTHROGRAM)  3/25/2019        reports that he has never smoked. He has never used smokeless tobacco. He reports that he does not drink alcohol.    No current outpatient medications on file.    The following portions of the patient's history were reviewed and updated as appropriate: allergies, current medications, past family history, past medical history, past social history, past surgical history and problem list.    Review of Systems   Constitutional: Negative.    Genitourinary:  Positive for dysuria and testicular pain. Negative for scrotal swelling.           Objective:    /60   Pulse 72   Temp 97.8 °F (36.6 °C)   Ht 5' 7\" (1.702 m)   Wt 71.2 kg (157 lb)   SpO2 98%   BMI 24.59 kg/m²      Physical Exam  Vitals and nursing note reviewed.   Constitutional:       General: He is not " in acute distress.     Appearance: Normal appearance. He is normal weight. He is not ill-appearing.   Abdominal:      Hernia: There is no hernia in the left inguinal area or right inguinal area.   Genitourinary:     Penis: Normal and circumcised. No erythema or discharge.       Testes:         Right: Mass, tenderness, swelling, testicular hydrocele or varicocele not present. Right testis is descended. Cremasteric reflex is present.          Left: Tenderness present. Mass, swelling or varicocele ( small) not present. Left testis is descended. Cremasteric reflex is present.       Epididymis:      Right: Normal.      Left: Not inflamed or enlarged. No mass or tenderness.   Lymphadenopathy:      Lower Body: No right inguinal adenopathy. No left inguinal adenopathy.   Neurological:      Mental Status: He is alert.           Recent Results (from the past 6 weeks)   POCT urine dip    Collection Time: 12/19/24  4:36 PM   Result Value Ref Range    LEUKOCYTE ESTERASE,UA neg     NITRITE,UA neg     SL AMB POCT UROBILINOGEN norm     POCT URINE PROTEIN neg      PH,UA 7     BLOOD,UA neg     SPECIFIC GRAVITY,UA 1.005     KETONES,UA neg     BILIRUBIN,UA neg     GLUCOSE, UA norm      COLOR,UA yellow     CLARITY,UA clear        Assessment/Plan:    History of urethritis  Explained to the patient that he has a history of urethritis.  He does not presently have urethritis.  Reassured him of that.  He is unable to take NSAIDs due to allergic reaction    -Advised for the patient to drink adequate amount of water, urinate after sexual intercourse and not to fixate on his history of chlamydia and urethritis    History of chlamydia infection  He has been tested repeatedly.  Urinalysis in the office is completely unremarkable    -He was treated with course of a azithromycin and then was treated by urology with a course of doxycycline.  This is more than sufficient treatment          Problem List Items Addressed This Visit        Other     History of chlamydia infection - Primary    He has been tested repeatedly.  Urinalysis in the office is completely unremarkable    -He was treated with course of a azithromycin and then was treated by urology with a course of doxycycline.  This is more than sufficient treatment         Relevant Orders    POCT urine dip (Completed)    History of urethritis    Explained to the patient that he has a history of urethritis.  He does not presently have urethritis.  Reassured him of that.  He is unable to take NSAIDs due to allergic reaction    -Advised for the patient to drink adequate amount of water, urinate after sexual intercourse and not to fixate on his history of chlamydia and urethritis         Relevant Orders    POCT urine dip (Completed)

## 2024-12-19 NOTE — ASSESSMENT & PLAN NOTE
Explained to the patient that he has a history of urethritis.  He does not presently have urethritis.  Reassured him of that.  He is unable to take NSAIDs due to allergic reaction    -Advised for the patient to drink adequate amount of water, urinate after sexual intercourse and not to fixate on his history of chlamydia and urethritis

## 2024-12-19 NOTE — ASSESSMENT & PLAN NOTE
He has been tested repeatedly.  Urinalysis in the office is completely unremarkable    -He was treated with course of a azithromycin and then was treated by urology with a course of doxycycline.  This is more than sufficient treatment